# Patient Record
Sex: FEMALE | Race: WHITE | NOT HISPANIC OR LATINO | Employment: STUDENT | URBAN - METROPOLITAN AREA
[De-identification: names, ages, dates, MRNs, and addresses within clinical notes are randomized per-mention and may not be internally consistent; named-entity substitution may affect disease eponyms.]

---

## 2017-11-22 ENCOUNTER — GENERIC CONVERSION - ENCOUNTER (OUTPATIENT)
Dept: OTHER | Facility: OTHER | Age: 22
End: 2017-11-22

## 2018-03-22 VITALS
RESPIRATION RATE: 16 BRPM | WEIGHT: 188.38 LBS | BODY MASS INDEX: 32.16 KG/M2 | HEIGHT: 64 IN | HEART RATE: 76 BPM | DIASTOLIC BLOOD PRESSURE: 74 MMHG | SYSTOLIC BLOOD PRESSURE: 116 MMHG

## 2018-03-22 NOTE — PROGRESS NOTES
Assessment    1  Encounter for preventive health examination (V70 0) (Z00 00)   2  Acne vulgaris (706 1) (L70 0)   3  Infected cyst of skin (706 2) (L72 9,L08 9)    Plan  Acne vulgaris    · Clindamycin Phosphate 1 % External Gel; APPLY AND GENTLY MASSAGE INTO  AFFECTED AREA(S) TWICE DAILY  Health Maintenance    · Follow-up visit in 1 year Evaluation and Treatment  Follow-up  Status: Hold For -  Scheduling  Requested for: 86QKD3490   · Always use a seat belt and shoulder strap when riding or driving a motor vehicle ;  Status:Complete;   Done: 65EFA3629   · Begin a limited exercise program ; Status:Complete;   Done: 85BJQ5628   · Begin or continue regular aerobic exercise  Gradually work up to at least 3 sessions of 30  minutes of exercise a week ; Status:Complete;   Done: 27WMI7353   · Brush your teeth 3 times a day and floss at least once a day ; Status:Complete;   Done:  37MNE3356   · Drink plenty of fluids ; Status:Complete;   Done: 87ZKD5042   · Eat a low fat and low cholesterol diet ; Status:Complete;   Done: 30YJW5663   · Eat a normal well-balanced diet ; Status:Complete;   Done: 12GME5265   · Eat foods that are high in calcium ; Status:Complete;   Done: 05QZV5341   · Use a sun block product with an SPF of 15 or more ; Status:Complete;   Done:  78ZPM8506   · Vitamins can help you get daily requirements that your diet may not be giving you ;  Status:Complete;   Done: 93MOK0828   · We recommend routine visits to a dentist ; Status:Complete;   Done: 23OEU6438   · Call [phone] if: You find a new or different kind of lump in your breast ;  Status:Complete;   Done: 99YHM4793   · Call [phone] if: You have any warning signs of skin cancer ; Status:Complete;    Done: 10MLV1519  Infected cyst of skin    · Cephalexin 500 MG Oral Capsule (Keflex); TAKE 1 CAPSULE EVERY 12 HOURS  DAILY    Discussion/Summary  health maintenance visit Currently, she eats an adequate diet   the risks and benefits of cervical cancer screening were discussed cervical cancer screening is not indicated Breast cancer screening: the risks and benefits of breast cancer screening were discussed and breast cancer screening is not indicated  Colorectal cancer screening: the risks and benefits of colorectal cancer screening were discussed and colorectal cancer screening is not indicated  Osteoporosis screening: the risks and benefits of osteoporosis screening were discussed and bone mineral density testing is not indicated  The immunizations are up to date  She was advised to be evaluated by an ophthalmologist and a dentist  Advice and education were given regarding nutrition, contraception, self skin examination and helmet use  Chief Complaint  PT here for New PCP and Physical      History of Present Illness  HM, Adult Female: The patient is being seen for a health maintenance evaluation  The last health maintenance visit was 2 year(s) ago  General Health: The patient's health since the last visit is described as good  She has regular dental visits  She complains of vision problems  Vision care includes wearing glasses  She denies hearing loss  Immunizations status: not up to date  Lifestyle:  She consumes a diverse and healthy diet  She does not have any weight concerns  She exercises regularly  She does not use tobacco  She denies alcohol use  She denies drug use  Reproductive health:  she reports normal menses  she uses no contraception  she is not sexually active  she denies prior pregnancies  Screening: cancer screening reviewed and updated  metabolic screening reviewed and updated  risk screening reviewed and updated  HPI: c/o swelling around the front of her left ear  getting worse for the last 1 month        Review of Systems    Constitutional: No fever, no chills, feels well, no tiredness, no recent weight gain or weight loss     Eyes: No complaints of eye pain, no red eyes, no eyesight problems, no discharge, no dry eyes, no itching of eyes  ENT: no complaints of earache, no loss of hearing, no nose bleeds, no nasal discharge, no sore throat, no hoarseness  Cardiovascular: No complaints of slow heart rate, no fast heart rate, no chest pain, no palpitations, no leg claudication, no lower extremity edema  Respiratory: No complaints of shortness of breath, no wheezing, no cough, no SOB on exertion, no orthopnea, no PND  Gastrointestinal: No complaints of abdominal pain, no constipation, no nausea or vomiting, no diarrhea, no bloody stools  Genitourinary: No complaints of dysuria, no incontinence, no pelvic pain, no dysmenorrhea, no vaginal discharge or bleeding  Musculoskeletal: No complaints of arthralgias, no myalgias, no joint swelling or stiffness, no limb pain or swelling  Integumentary: a rash and skin lesion  Neurological: No complaints of headache, no confusion, no convulsions, no numbness, no dizziness or fainting, no tingling, no limb weakness, no difficulty walking  Psychiatric: Not suicidal, no sleep disturbance, no anxiety or depression, no change in personality, no emotional problems  Endocrine: No complaints of proptosis, no hot flashes, no muscle weakness, no deepening of the voice, no feelings of weakness  Hematologic/Lymphatic: No complaints of swollen glands, no swollen glands in the neck, does not bleed easily, does not bruise easily  Surgical History    · History of Oral Surgery Tooth Extraction Jewell Tooth    Family History  Maternal Grandmother    · Family history of COPD, moderate   · Family history of Bell's palsy (V17 2) (Z82 0)   · Family history of malignant neoplasm of breast (V16 3) (Z80 3)  Maternal Grandfather    · Family history of hypertension (V17 49) (Z82 49)   · Family history of myocardial infarction (V17 3) (Z82 49)   · Family history of renal failure (V18 69) (Z84 1)    Social History    · Never a smoker   · No alcohol use    Allergies    1   Seasonal    Vitals Recorded: 22Nov2017 11:06AM   Heart Rate 76   Respiration 16   Systolic 586   Diastolic 74   Height 5 ft 4 06 in   Weight 188 lb 6 oz   BMI Calculated 32 28   BSA Calculated 1 91     Physical Exam    Constitutional   General appearance: No acute distress, well appearing and well nourished  Head and Face   Head and face: Normal     Palpation of the face and sinuses: No sinus tenderness  Eyes   Conjunctiva and lids: No swelling, erythema or discharge  Pupils and irises: Equal, round, reactive to light  Ophthalmoscopic examination: Normal fundi and optic discs  Ears, Nose, Mouth, and Throat   External inspection of ears and nose: Normal     Otoscopic examination: Tympanic membranes translucent with normal light reflex  Canals patent without erythema  Hearing: Normal     Nasal mucosa, septum, and turbinates: Normal without edema or erythema  Lips, teeth, and gums: Normal, good dentition  Oropharynx: Normal with no erythema, edema, exudate or lesions  Neck   Neck: Supple, symmetric, trachea midline, no masses  Thyroid: Normal, no thyromegaly  Pulmonary   Respiratory effort: No increased work of breathing or signs of respiratory distress  Percussion of chest: Normal     Auscultation of lungs: Clear to auscultation  Cardiovascular   Palpation of heart: Normal PMI, no thrills  Auscultation of heart: Normal rate and rhythm, normal S1 and S2, no murmurs  Examination of extremities for edema and/or varicosities: Normal     Chest   Chest: Normal     Abdomen   Abdomen: Non-tender, no masses  Liver and spleen: No hepatomegaly or splenomegaly  Examination for hernias: No hernia appreciated  Lymphatic   Palpation of lymph nodes in neck: No lymphadenopathy  Palpation of lymph nodes in axillae: No lymphadenopathy  Palpation of lymph nodes in groin: No lymphadenopathy  Musculoskeletal   Gait and station: Normal     Digits and nails: Normal without clubbing or cyanosis  Joints, bones, and muscles: Normal     Range of motion: Normal     Stability: Normal     Muscle strength/tone: Normal     Skin   Skin and subcutaneous tissue: Abnormal   (small inclusion cyst over left lower temporal region close to left ear  ) Clinical impression: acne (on the scalp, on the face and on both cheeks )  Neurologic   Cranial nerves: Cranial nerves II-XII intact  Cortical function: Normal mental status  Reflexes: 2+ and symmetric  Sensation: No sensory loss  Coordination: Normal finger to nose and heel to shin  Psychiatric   Judgment and insight: Normal     Orientation to person, place, and time: Normal     Recent and remote memory: Intact  Mood and affect: Normal        Results/Data  PHQ-2 Adult Depression Screening 22Nov2017 11:13AM User, Ahs     Test Name Result Flag Reference   PHQ-2 Adult Depression Score 0     Over the last two weeks, how often have you been bothered by any of the following problems?   Little interest or pleasure in doing things: Not at all - 0  Feeling down, depressed, or hopeless: Not at all - 0   PHQ-2 Adult Depression Screening Negative         Signatures   Electronically signed by : Nereida Arias MD; Nov 22 2017 11:48AM EST                       (Author)

## 2018-07-31 ENCOUNTER — TELEPHONE (OUTPATIENT)
Dept: FAMILY MEDICINE CLINIC | Facility: CLINIC | Age: 23
End: 2018-07-31

## 2018-08-08 ENCOUNTER — OFFICE VISIT (OUTPATIENT)
Dept: OBGYN CLINIC | Facility: CLINIC | Age: 23
End: 2018-08-08
Payer: COMMERCIAL

## 2018-08-08 VITALS
SYSTOLIC BLOOD PRESSURE: 106 MMHG | BODY MASS INDEX: 30.56 KG/M2 | WEIGHT: 179 LBS | DIASTOLIC BLOOD PRESSURE: 64 MMHG | HEIGHT: 64 IN

## 2018-08-08 DIAGNOSIS — N94.6 DYSMENORRHEA: ICD-10-CM

## 2018-08-08 DIAGNOSIS — N93.9 ABNORMAL UTERINE BLEEDING: Primary | ICD-10-CM

## 2018-08-08 PROBLEM — L70.0 ACNE VULGARIS: Status: ACTIVE | Noted: 2017-11-22

## 2018-08-08 PROCEDURE — 99214 OFFICE O/P EST MOD 30 MIN: CPT | Performed by: OBSTETRICS & GYNECOLOGY

## 2018-08-08 RX ORDER — NORETHINDRONE ACETATE AND ETHINYL ESTRADIOL 1MG-20(21)
1 KIT ORAL DAILY
Qty: 84 TABLET | Refills: 3 | Status: SHIPPED | OUTPATIENT
Start: 2018-08-08 | End: 2018-12-19 | Stop reason: SDUPTHER

## 2018-08-08 NOTE — PROGRESS NOTES
Assessment/Plan: 22 yo with dysmenorrhea and heavy periods  1) dysmenorrhea- discussed NSAID use   ocp's ordered  2) AUB-H: CBC and TSH ordered  Discussed OCP use  Patient is aware the risks benefits and alternatives is taking oral contraceptive pills  She is aware of an 8% failure rate with expected use  She is also aware the birth control pills do not help prevent the spread of STDs  In order to help prevent the spread of STDs, patient is aware that she still needs to use condoms  She is aware that she is take pills at the same time every day  She is also aware that she can have abnormal uterine bleeding with the pills  She will continue the pills for 3-6 months  She is aware of the risk of deep vein thrombosis  She will call our office with any questions  She was given instructions today on how to take the pills and what to do in the event that she misses a pills  She will follow up in 3 months for a pill check  Problem List Items Addressed This Visit        Genitourinary    Abnormal uterine bleeding - Primary     -Started on junel fe 1/20          Relevant Medications    norethindrone-ethinyl estradiol (JUNEL FE 1/20) 1-20 MG-MCG per tablet    Other Relevant Orders    TSH, 3rd generation with T4 reflex    CBC    Dysmenorrhea            Rudy Quarles is a 23 y o  female never sexually active, with LMP 7/18/18 here for a problem visit  Patient is complaining of heavy painful periods  Sx's started 2 years ago  Patient reports that sx's are related to her menses  Periods are monthly and last 7 days  The first couple days are the heaviest with cramps starting a week before  She changes her tampon every 2 hours on the first and second days  Denies any problems with bleeding with dental procedures  Takes midol for the pain which slightly helps    Pain is 6/10 on the first couple days of her period      Gynecologic History  Patient's last menstrual period was 07/18/2018 (approximate)  Contraception: none    The following portions of the patient's history were reviewed and updated as appropriate: allergies, current medications, past family history, past medical history, past social history, past surgical history and problem list     Review of Systems  Pertinent items are noted in HPI  Objective    /64 (BP Location: Right arm, Patient Position: Sitting, Cuff Size: Standard)   Ht 5' 4" (1 626 m)   Wt 81 2 kg (179 lb)   LMP 07/18/2018 (Approximate)   BMI 30 73 kg/m²    GEN: The patient was alert and oriented x3, pleasant well-appearing female in no acute distress  CV: RRR, no murmurs  CHEST: BLCTA   No crackles or wheezes  ABD: positive bowel sounds, no guarding, no masses

## 2018-08-16 ENCOUNTER — OFFICE VISIT (OUTPATIENT)
Dept: FAMILY MEDICINE CLINIC | Facility: CLINIC | Age: 23
End: 2018-08-16
Payer: COMMERCIAL

## 2018-08-16 VITALS
DIASTOLIC BLOOD PRESSURE: 70 MMHG | RESPIRATION RATE: 16 BRPM | SYSTOLIC BLOOD PRESSURE: 116 MMHG | WEIGHT: 179 LBS | HEART RATE: 68 BPM | HEIGHT: 65 IN | OXYGEN SATURATION: 97 % | BODY MASS INDEX: 29.82 KG/M2

## 2018-08-16 DIAGNOSIS — Z00.00 ENCOUNTER FOR WELL ADULT EXAM WITHOUT ABNORMAL FINDINGS: Primary | ICD-10-CM

## 2018-08-16 DIAGNOSIS — Z11.1 ENCOUNTER FOR PPD TEST: ICD-10-CM

## 2018-08-16 PROBLEM — N94.6 DYSMENORRHEA: Status: RESOLVED | Noted: 2018-08-08 | Resolved: 2018-08-16

## 2018-08-16 PROCEDURE — 99395 PREV VISIT EST AGE 18-39: CPT | Performed by: FAMILY MEDICINE

## 2018-08-16 NOTE — PROGRESS NOTES
Ananda Raymond was seen today for annual exam     Diagnoses and all orders for this visit:    Encounter for well adult exam without abnormal findings    Encounter for PPD test  -     Cancel: TB Skin Test      Patient Counseling:  --Nutrition: Stressed importance of moderation in sodium/caffeine intake, saturated fat and cholesterol, caloric balance, sufficient intake of fresh fruits, vegetables, fiber, calcium, iron, and 1 mg of folate supplement per day   --Discussed  calcium supplement, and the daily use of baby aspirin  --Exercise: Stressed the importance of regular exercise  --Substance Abuse: Discussed cessation/primary prevention of tobacco, alcohol, or other drug use; driving or other dangerous activities under the influence; availability of treatment for abuse  --Sexuality: Discussed sexually transmitted diseases, partner selection, use of condoms, avoidance of unintended pregnancy  and contraceptive alternatives  --Injury prevention: Discussed safety belts, safety helmets, smoke detector, smoking near bedding or upholstery  --Dental health: Discussed importance of regular tooth brushing, flossing, and dental visits  --Immunizations reviewed  --Discussed benefits of screening colonoscopy    Chief Complaint   Patient presents with    Annual Exam       HPI    Patient here for a comprehensive physical exam The patient reports no problems    Do you take any herbs or supplements that were not prescribed by a doctor? no   Are you taking calcium supplements? no   Are you taking aspirin daily? no  How often do you exercise? 3 times a week of cardio and weight training   Diet? 2-3 servings of fruits and vegetables   Dental visit:   Yearly     Vision test: wears glasses for distance   Over 2 years ago     Colonoscopy:  Never had one done      History:  LMP: Patient's last menstrual period was 2018 (approximate)    Last pap date: none   Abnormal pap? no  : 0  Para: 0          History   Sexual Activity    Sexual activity: No             Review of Systems   Constitutional: Negative  HENT: Negative  Eyes: Negative  Respiratory: Negative  Cardiovascular: Negative  Gastrointestinal: Negative  Endocrine: Negative  Genitourinary: Negative  Musculoskeletal: Negative  Skin: Negative  Allergic/Immunologic: Negative  Neurological: Negative  Hematological: Negative  Psychiatric/Behavioral: Negative  Family History   Problem Relation Age of Onset    COPD Maternal Grandmother     Bell's palsy Maternal Grandmother     Breast cancer Maternal Grandmother     Hypertension Maternal Grandfather     Heart attack Maternal Grandfather     Kidney failure Maternal Grandfather     No Known Problems Mother     No Known Problems Father     Breast cancer Paternal Aunt     Uterine cancer Paternal Aunt     Melanoma Paternal Aunt        No past medical history on file  Social History     Social History    Marital status: Single     Spouse name: N/A    Number of children: N/A    Years of education: N/A     Occupational History    Not on file  Social History Main Topics    Smoking status: Never Smoker    Smokeless tobacco: Never Used    Alcohol use No    Drug use: No    Sexual activity: No     Other Topics Concern    Not on file     Social History Narrative    No narrative on file       Current Outpatient Prescriptions on File Prior to Visit   Medication Sig Dispense Refill    norethindrone-ethinyl estradiol (JUNEL FE 1/20) 1-20 MG-MCG per tablet Take 1 tablet by mouth daily 84 tablet 3     No current facility-administered medications on file prior to visit            No Known Allergies      Vitals:    08/16/18 1048   BP: 116/70   BP Location: Left arm   Patient Position: Sitting   Cuff Size: Standard   Pulse: 68   Resp: 16   SpO2: 97%   Weight: 81 2 kg (179 lb)   Height: 5' 5 08" (1 653 m)         Physical Exam   Constitutional: She is oriented to person, place, and time  Vital signs are normal  She appears well-developed and well-nourished  HENT:   Head: Normocephalic and atraumatic  Nose: Nose normal    Mouth/Throat: Oropharynx is clear and moist    Eyes: Pupils are equal, round, and reactive to light  Neck: Normal range of motion  Neck supple  No thyromegaly present  Cardiovascular: Normal rate and regular rhythm  No murmur heard  Pulmonary/Chest: Effort normal and breath sounds normal    Abdominal: Soft  Bowel sounds are normal    Musculoskeletal: Normal range of motion  She exhibits no edema or deformity  Neurological: She is alert and oriented to person, place, and time  She has normal reflexes  Skin: Skin is warm  No rash noted  No erythema  Psychiatric: She has a normal mood and affect   Her behavior is normal

## 2018-08-17 ENCOUNTER — OFFICE VISIT (OUTPATIENT)
Dept: FAMILY MEDICINE CLINIC | Facility: CLINIC | Age: 23
End: 2018-08-17
Payer: COMMERCIAL

## 2018-08-17 DIAGNOSIS — Z11.1 PPD SCREENING TEST: Primary | ICD-10-CM

## 2018-08-17 PROCEDURE — 86580 TB INTRADERMAL TEST: CPT | Performed by: FAMILY MEDICINE

## 2018-08-17 NOTE — PROGRESS NOTES
Assessment/Plan:    No problem-specific Assessment & Plan notes found for this encounter  Diagnoses and all orders for this visit:    PPD screening test  -     TB Skin Test          Subjective:      Patient ID: Roby Schlatter is a 23 y o  female      HPI        Review of Systems      Objective:      LMP 07/18/2018 (Approximate)          Physical Exam

## 2018-08-20 LAB
INDURATION: 0 MM
TB SKIN TEST: NEGATIVE

## 2018-08-27 ENCOUNTER — TELEPHONE (OUTPATIENT)
Dept: OBGYN CLINIC | Facility: CLINIC | Age: 23
End: 2018-08-27

## 2018-08-27 NOTE — TELEPHONE ENCOUNTER
Marcos called Friday afternoon while I was on another line and left a message on the  mailbox  She states she saw Dr Terry Watkins and she ordered labs for her  She had them done last week at [facility] and wants the results  Please call with results   [phone]

## 2018-08-28 NOTE — TELEPHONE ENCOUNTER
Left message for patient to call the office  Routing to provider for advise patient call for results

## 2018-08-29 NOTE — TELEPHONE ENCOUNTER
Results are not available  CBC and TSH were ordered  Where did she get them done?  Can we call the lab and get the results

## 2018-08-30 NOTE — TELEPHONE ENCOUNTER
I called [facility] and they looked up Dara Catherine and said they have no record of her having any labs done in August and do not show her having a CBC and TSH recently    I left Dara Alexander a message asking her to call the office to verify that she had the labs done at [facility]

## 2018-09-04 NOTE — TELEPHONE ENCOUNTER
Marcos left a message on the nurse line stating she went to [address] [place] [address] on August 9th on   MAG[address]  I called [facility] and again, they could not find results for her  The lab at that address was already closed for the day (it was after 3:30), but the representative I was speaking with went to a supervisor to look for results under our account number for her and was unable to find any testing done within the last 6 months    We will notifiy the patient of this and she can call the lab or we can give her a copy of the orders and she can have it drawn again,

## 2018-09-04 NOTE — TELEPHONE ENCOUNTER
Patient left message on nurse line states she had blood work done 8/9/18 at [place] [address] on 8/9/18 can be reached tomorrow 9/5/18 after 2pm   Warren Burrell, aware will check into again

## 2018-09-05 NOTE — TELEPHONE ENCOUNTER
Spoke with patient on 9/5/18 to let her know I spoke with Ja Jeanmarie a second time and they still could not find record of her having the bloodwork on 8/9/18  I told her if she wanted to call herself, I'd give her their number or we could re-print the orders and she could have it drawn again  She said she is in  [address] [address] at school so she is going to try to find a [facility] near her  She will call us with the fax number and we will fax the order to the lab

## 2018-12-19 ENCOUNTER — OFFICE VISIT (OUTPATIENT)
Dept: OBGYN CLINIC | Facility: CLINIC | Age: 23
End: 2018-12-19
Payer: COMMERCIAL

## 2018-12-19 VITALS — DIASTOLIC BLOOD PRESSURE: 72 MMHG | SYSTOLIC BLOOD PRESSURE: 114 MMHG | BODY MASS INDEX: 30.21 KG/M2 | WEIGHT: 182 LBS

## 2018-12-19 DIAGNOSIS — N93.9 ABNORMAL UTERINE BLEEDING: Primary | ICD-10-CM

## 2018-12-19 DIAGNOSIS — Z30.41 ENCOUNTER FOR SURVEILLANCE OF CONTRACEPTIVE PILLS: ICD-10-CM

## 2018-12-19 PROCEDURE — 99213 OFFICE O/P EST LOW 20 MIN: CPT | Performed by: OBSTETRICS & GYNECOLOGY

## 2018-12-19 RX ORDER — NORETHINDRONE ACETATE AND ETHINYL ESTRADIOL 1MG-20(21)
1 KIT ORAL DAILY
Qty: 84 TABLET | Refills: 3 | Status: SHIPPED | OUTPATIENT
Start: 2018-12-19 | End: 2019-06-12 | Stop reason: SDUPTHER

## 2018-12-19 NOTE — PROGRESS NOTES
Assessment/Plan: 27 yo for pill check  1) OCP check-  Patient is having spotting, but overall likes the pills  Will continue for another 3 months  Has never been sexually active  Will do blind pap at annual this year  If she calls before her annual to change her pill, I will switch her to sprintec  2) AUB- much improved with ocp's  Never received lab results  Will get TSH done  Subjective      Kristin Vega is a 26 y o  female who presents for contraception counseling  The patient complains about irregular spotting with the pill, but it is starting to get better  The patient has never been sexually active  Pertinent past medical history: none  Patient Active Problem List   Diagnosis    Acne vulgaris    Abnormal uterine bleeding       History reviewed  No pertinent past medical history  Past Surgical History:   Procedure Laterality Date    WISDOM TOOTH EXTRACTION         Family History   Problem Relation Age of Onset    COPD Maternal Grandmother     Bell's palsy Maternal Grandmother     Breast cancer Maternal Grandmother     Hypertension Maternal Grandfather     Heart attack Maternal Grandfather     Kidney failure Maternal Grandfather     No Known Problems Mother     No Known Problems Father     Breast cancer Paternal Aunt     Uterine cancer Paternal Aunt     Melanoma Paternal Aunt        Social History     Social History    Marital status: Single     Spouse name: N/A    Number of children: N/A    Years of education: N/A     Occupational History    Not on file       Social History Main Topics    Smoking status: Never Smoker    Smokeless tobacco: Never Used    Alcohol use No    Drug use: No    Sexual activity: No     Other Topics Concern    Not on file     Social History Narrative    No narrative on file          Current Outpatient Prescriptions:     norethindrone-ethinyl estradiol (JUNEL FE 1/20) 1-20 MG-MCG per tablet, Take 1 tablet by mouth daily, Disp: 84 tablet, Rfl: 3    No Known Allergies    Review of Systems  Constitutional :no fever, feels well, no tiredness, no recent weight gain or loss  ENT: no ear ache, no loss of hearing, no nosebleeds or nasal discharge, no sore throat or hoarseness  Cardiovascular: no complaints of slow or fast heart beat, no chest pain, no palpitations, no leg claudication or lower extremity edema  Respiratory: no complaints of shortness of shortness of breath, no BAKER  Breasts:no complaints of breast pain, breast lump, or nipple discharge  Gastrointestinal: no complaints of abdominal pain, constipation,nausea, vomiting, or diarrhea or bloody stools  Genitourinary : no complaints of dysuria, incontinence, pelvic pain, no dysmenorrhea, vaginal discharge or abnormal vaginal bleeding and as noted in HPI  Integumentary: no complaints of skin rash or lesion, itching or dry skin  Neurological: no complaints of headache, no confusion, no numbness or tingling, no dizziness or fainting    Objective     /72   Wt 82 6 kg (182 lb)   LMP 12/05/2018 (Exact Date)   Breastfeeding?  No   BMI 30 21 kg/m²     General:   appears stated age, cooperative, alert normal mood and affect   Neck: Neck: normal, supple,trachea midline, no masses   Heart: regular rate and rhythm, S1, S2 normal, no murmur, click, rub or gallop   Lungs: clear to auscultation bilaterally   Abdomen: soft, non-tender, without masses or organomegaly

## 2018-12-27 ENCOUNTER — OFFICE VISIT (OUTPATIENT)
Dept: FAMILY MEDICINE CLINIC | Facility: CLINIC | Age: 23
End: 2018-12-27
Payer: COMMERCIAL

## 2018-12-27 VITALS
TEMPERATURE: 98.3 F | HEIGHT: 65 IN | OXYGEN SATURATION: 99 % | SYSTOLIC BLOOD PRESSURE: 110 MMHG | DIASTOLIC BLOOD PRESSURE: 74 MMHG | HEART RATE: 59 BPM | RESPIRATION RATE: 16 BRPM | BODY MASS INDEX: 30.92 KG/M2 | WEIGHT: 185.6 LBS

## 2018-12-27 DIAGNOSIS — T78.40XA ALLERGIC REACTION, INITIAL ENCOUNTER: Primary | ICD-10-CM

## 2018-12-27 PROCEDURE — 99213 OFFICE O/P EST LOW 20 MIN: CPT | Performed by: FAMILY MEDICINE

## 2018-12-27 PROCEDURE — 1036F TOBACCO NON-USER: CPT | Performed by: FAMILY MEDICINE

## 2018-12-27 NOTE — PROGRESS NOTES
Assessment/Plan:    No problem-specific Assessment & Plan notes found for this encounter  Diagnoses and all orders for this visit:    Allergic reaction, initial encounter  Comments:  Pt stable on exam  OTC Claritin & Zantac during the day at work, & avoid foods from last night; Benadryl PRN  Allergy Medicine referral  Precautions given  Orders:  -     Ambulatory referral to Allergy; Future          Subjective:      Patient ID: Sonny Foss is a 26 y o  female  Presents with her mother today  Last night developed diffuse hives, tongue swelling, trouble breathing  Had eaten Hemp seeds / smoothie last night - new to her  No soap, medication, other dietary changes, etc   Mother's inhaler helped  Also took OTC Benadryl  Symptoms resolved overnight  The following portions of the patient's history were reviewed and updated as appropriate: allergies, current medications, past family history, past social history, past surgical history and problem list     History reviewed  No pertinent past medical history  Current Outpatient Prescriptions:     norethindrone-ethinyl estradiol (JUNEL FE 1/20) 1-20 MG-MCG per tablet, Take 1 tablet by mouth daily, Disp: 84 tablet, Rfl: 3  No Known Allergies    No Known Allergies      Review of Systems   Constitutional: Negative for activity change and fever  HENT: Negative for sore throat  No tongue or throat swelling now  Respiratory: Negative for shortness of breath  Cardiovascular: Negative for chest pain  Skin: Negative for rash  Rash resolved  Objective:      /74 (BP Location: Right arm, Patient Position: Sitting, Cuff Size: Standard)   Pulse 59   Temp 98 3 °F (36 8 °C) (Tympanic)   Resp 16   Ht 5' 5 08" (1 653 m)   Wt 84 2 kg (185 lb 9 6 oz)   LMP 12/05/2018 (Exact Date)   SpO2 99%   BMI 30 81 kg/m²          Physical Exam   Constitutional: She is oriented to person, place, and time   She appears well-developed and well-nourished  No distress  HENT:   Head: Normocephalic and atraumatic  Mouth/Throat: Oropharynx is clear and moist  No oropharyngeal exudate  Eyes: Conjunctivae are normal    Neck: Normal range of motion  Neck supple  No thyromegaly present  Cardiovascular: Normal rate and normal heart sounds  Exam reveals no gallop and no friction rub  No murmur heard  Pulmonary/Chest: Breath sounds normal  No stridor  No respiratory distress  She has no wheezes  She has no rales  Lymphadenopathy:     She has no cervical adenopathy  Neurological: She is alert and oriented to person, place, and time  Skin: No rash noted  She is not diaphoretic  Psychiatric: She has a normal mood and affect  Her behavior is normal  Judgment and thought content normal    Nursing note and vitals reviewed

## 2018-12-28 ENCOUNTER — TELEPHONE (OUTPATIENT)
Dept: FAMILY MEDICINE CLINIC | Facility: CLINIC | Age: 23
End: 2018-12-28

## 2018-12-28 NOTE — TELEPHONE ENCOUNTER
PT WOULD LIKE TO GET HER TETNUS AND PERTUSIS VACCINE  PT STATES SISTER IS GONNA HAVE A BABY   PLS ADVISE

## 2019-01-02 ENCOUNTER — TELEPHONE (OUTPATIENT)
Dept: FAMILY MEDICINE CLINIC | Facility: CLINIC | Age: 24
End: 2019-01-02

## 2019-01-04 ENCOUNTER — CLINICAL SUPPORT (OUTPATIENT)
Dept: FAMILY MEDICINE CLINIC | Facility: CLINIC | Age: 24
End: 2019-01-04
Payer: COMMERCIAL

## 2019-01-04 DIAGNOSIS — Z23 NEED FOR VACCINATION: Primary | ICD-10-CM

## 2019-01-04 PROCEDURE — 90471 IMMUNIZATION ADMIN: CPT

## 2019-01-04 PROCEDURE — 90715 TDAP VACCINE 7 YRS/> IM: CPT

## 2019-01-16 ENCOUNTER — TELEPHONE (OUTPATIENT)
Dept: OBGYN CLINIC | Facility: CLINIC | Age: 24
End: 2019-01-16

## 2019-01-16 NOTE — TELEPHONE ENCOUNTER
Patient states she had blood work done on 12/21/18 calling for results  Advised we do not have results in the computer  Pt went to [facility]  Advised patient to call them to send them to the office  Verbalized understanding

## 2019-06-12 ENCOUNTER — ANNUAL EXAM (OUTPATIENT)
Dept: OBGYN CLINIC | Facility: CLINIC | Age: 24
End: 2019-06-12
Payer: COMMERCIAL

## 2019-06-12 VITALS
DIASTOLIC BLOOD PRESSURE: 68 MMHG | SYSTOLIC BLOOD PRESSURE: 116 MMHG | BODY MASS INDEX: 30.05 KG/M2 | HEIGHT: 64 IN | WEIGHT: 176 LBS

## 2019-06-12 DIAGNOSIS — Z01.419 WELL FEMALE EXAM WITH ROUTINE GYNECOLOGICAL EXAM: Primary | ICD-10-CM

## 2019-06-12 DIAGNOSIS — Z12.4 SCREENING FOR MALIGNANT NEOPLASM OF CERVIX: ICD-10-CM

## 2019-06-12 DIAGNOSIS — N93.9 ABNORMAL UTERINE BLEEDING: ICD-10-CM

## 2019-06-12 PROCEDURE — 99395 PREV VISIT EST AGE 18-39: CPT | Performed by: OBSTETRICS & GYNECOLOGY

## 2019-06-12 RX ORDER — NORETHINDRONE ACETATE AND ETHINYL ESTRADIOL 1MG-20(21)
1 KIT ORAL DAILY
Qty: 84 TABLET | Refills: 3 | Status: SHIPPED | OUTPATIENT
Start: 2019-06-12 | End: 2020-11-13

## 2019-06-12 RX ORDER — LORATADINE 10 MG/1
10 TABLET ORAL DAILY
COMMUNITY

## 2019-06-15 LAB
CYTOLOGIST CVX/VAG CYTO: NORMAL
DX ICD CODE: NORMAL
Lab: NORMAL
OTHER STN SPEC: NORMAL
PATH REPORT.FINAL DX SPEC: NORMAL
SL AMB NOTE:: NORMAL
SL AMB SPECIMEN ADEQUACY: NORMAL
SL AMB TEST METHODOLOGY: NORMAL

## 2020-02-20 ENCOUNTER — TELEPHONE (OUTPATIENT)
Dept: OBGYN CLINIC | Facility: CLINIC | Age: 25
End: 2020-02-20

## 2020-02-20 DIAGNOSIS — Z30.41 ENCOUNTER FOR SURVEILLANCE OF CONTRACEPTIVE PILLS: Primary | ICD-10-CM

## 2020-02-20 RX ORDER — NORETHINDRONE ACETATE AND ETHINYL ESTRADIOL 1.5-30(21)
1 KIT ORAL DAILY
Qty: 84 TABLET | Refills: 3 | Status: SHIPPED | OUTPATIENT
Start: 2020-02-20 | End: 2020-11-13

## 2020-02-20 NOTE — TELEPHONE ENCOUNTER
Willie taking Junel Fe 1/20 about 1 1/2 - 2 years, c/o spotting in between menses every other week along with cramping for about 3 months  She is taking the pill same time every day and she has not missed any pills  Acne is also starting to come back  She is on her placebo week right now  She is currently home on Spring break  Pharmacy updated  Routing to provider for advice

## 2020-02-21 NOTE — TELEPHONE ENCOUNTER
Advised patient new Rx Kal Tim 1 5/30 sent to pharmacy - please start when should would typically start new pack  May take about 3 months for body to regulate  Verbalized understanding

## 2020-11-13 ENCOUNTER — TELEPHONE (OUTPATIENT)
Dept: OBGYN CLINIC | Facility: CLINIC | Age: 25
End: 2020-11-13

## 2020-11-13 DIAGNOSIS — Z30.41 ENCOUNTER FOR SURVEILLANCE OF CONTRACEPTIVE PILLS: ICD-10-CM

## 2020-11-13 RX ORDER — NORETHINDRONE ACETATE AND ETHINYL ESTRADIOL 1.5-30(21)
1 KIT ORAL DAILY
Qty: 84 TABLET | Refills: 0 | Status: SHIPPED | OUTPATIENT
Start: 2020-11-13 | End: 2021-02-05

## 2021-02-05 DIAGNOSIS — Z30.41 ENCOUNTER FOR SURVEILLANCE OF CONTRACEPTIVE PILLS: ICD-10-CM

## 2021-02-05 RX ORDER — NORETHINDRONE ACETATE AND ETHINYL ESTRADIOL AND FERROUS FUMARATE 1.5-30(21)
KIT ORAL
Qty: 84 TABLET | Refills: 0 | Status: SHIPPED | OUTPATIENT
Start: 2021-02-05

## 2021-02-15 NOTE — TELEPHONE ENCOUNTER
LM for patient to call the office back to schedule an appointment  Include Location In Plan?: No Detail Level: Zone